# Patient Record
Sex: MALE | Race: WHITE | Employment: FULL TIME | URBAN - METROPOLITAN AREA
[De-identification: names, ages, dates, MRNs, and addresses within clinical notes are randomized per-mention and may not be internally consistent; named-entity substitution may affect disease eponyms.]

---

## 2017-01-11 ENCOUNTER — OFFICE VISIT (OUTPATIENT)
Dept: FAMILY MEDICINE CLINIC | Facility: CLINIC | Age: 47
End: 2017-01-11

## 2017-01-11 VITALS
WEIGHT: 294 LBS | SYSTOLIC BLOOD PRESSURE: 122 MMHG | RESPIRATION RATE: 14 BRPM | HEART RATE: 78 BPM | DIASTOLIC BLOOD PRESSURE: 78 MMHG | TEMPERATURE: 98 F | BODY MASS INDEX: 46.15 KG/M2 | HEIGHT: 67 IN

## 2017-01-11 DIAGNOSIS — J01.40 ACUTE NON-RECURRENT PANSINUSITIS: Primary | ICD-10-CM

## 2017-01-11 PROCEDURE — 99203 OFFICE O/P NEW LOW 30 MIN: CPT | Performed by: NURSE PRACTITIONER

## 2017-01-11 RX ORDER — AMOXICILLIN AND CLAVULANATE POTASSIUM 875; 125 MG/1; MG/1
1 TABLET, FILM COATED ORAL 2 TIMES DAILY
Qty: 14 TABLET | Refills: 0 | Status: SHIPPED | OUTPATIENT
Start: 2017-01-11 | End: 2017-01-18

## 2017-01-11 NOTE — PATIENT INSTRUCTIONS
Sinusitis (No Antibiotics)    The sinuses are air-filled spaces within the bones of the face. They connect to the inside of the nose. Sinusitis is an inflammation of the tissue lining the sinus cavity.  Sinus inflammation can occur during a cold. It can a · Use acetaminophen or ibuprofen to control pain, unless another pain medicine was prescribed. (If you have chronic liver or kidney disease or ever had a stomach ulcer, talk with your doctor before using these medicines.  Aspirin should never be used in any Your healthcare provider won’t usually prescribe antibiotics for the following conditions. You can help by not asking for them if you have:   · A cold. This type of illness is caused by a virus.  It can cause a runny nose, stuffed-up nose, sneezing, coughin · Strep throat. This is a throat infectioncaused by a certain type of bacteria. Symptoms of strep throat include a sore throat, white patches on the tonsils, red spots on the roof of the mouth, fever, body aches, and nausea and vomiting.   · Urinary tract i · Use a sore throat spray. · Use a humidifier or cool mist vaporizer. · Gargle with saltwater. · Drink warm liquids.   To ease ear pain:   · Hold a warm, moist washcloth on the ear for 10 minutes at a time.   Date Last Reviewed: 9/1/2016  © 9990-9453 The

## 2017-01-11 NOTE — PROGRESS NOTES
CHIEF COMPLAINT:   Patient presents with:  URI      HPI:   Luis Miller is a 51year old male who presents for upper respiratory symptoms for  3-4 days. Patient reports nasal pressure, congestion, and mild cough.  Location is  mid face, and upper chest. HEAD: atraumatic, normocephalic, no tenderness on palpation of maxillary sinuses is present. EYES: conjunctiva clear, EOM intact, normal pupils, PERRLA  EARS: Canals are clear with no discharge or inflammation, scant cerumen.  TM's are white and intact, no Amoxicillin-Pot Clavulanate 875-125 MG Oral Tab 14 tablet 0      Sig: Take 1 tablet by mouth 2 (two) times daily.            Discussed with patient that infection is likely viral but due to sudden onset, progressive symptoms, and physical exam findings, pa · Over-the-counter decongestants may be used unless a similar medicine was prescribed. Nasal sprays work the fastest. Use one that contains phenylephrine or oxymetazoline. First blow the nose gently. Then use the spray.  Do not use these medicines more ofte © 7640-7965 [address], [address] [place]. All rights reserved. This information is not intended as a substitute for professional medical care. Always follow your healthcare professional's instructions.         When to · Most ear infections. An ear infection may be caused by a virus or bacteria. It causes pain in the ear. Antibiotics usually don’t help, and the infection goes away on its own. · Most sinus infections (sinusitis).  This kind of infection causes sinus pain If your infection can’t be treated with antibiotics, you can take other steps to feel better. Try the remedies below. In general:   · Rest and sleep as much as needed. · Drink water and other clear fluids.   · Don’t smoke, and avoid smoke from other people · Lean forward slightly. · Cough twice—2 short coughs. · Relax for a few seconds. Repeat the steps as needed. The “dickinson” technique  · Sit on a chair with both feet on the floor. · Take a slow, deep breath through your nose and hold for 2 counts.   · To

## 2020-02-04 ENCOUNTER — OFFICE VISIT (OUTPATIENT)
Dept: FAMILY MEDICINE CLINIC | Facility: CLINIC | Age: 50
End: 2020-02-04
Payer: COMMERCIAL

## 2020-02-04 VITALS
OXYGEN SATURATION: 96 % | HEART RATE: 108 BPM | BODY MASS INDEX: 47.09 KG/M2 | WEIGHT: 300 LBS | TEMPERATURE: 100 F | HEIGHT: 67 IN | RESPIRATION RATE: 18 BRPM | SYSTOLIC BLOOD PRESSURE: 138 MMHG | DIASTOLIC BLOOD PRESSURE: 80 MMHG

## 2020-02-04 DIAGNOSIS — J10.1 INFLUENZA A WITH RESPIRATORY MANIFESTATIONS: Primary | ICD-10-CM

## 2020-02-04 DIAGNOSIS — F17.290 CIGAR SMOKER: ICD-10-CM

## 2020-02-04 LAB
POCT INFLUENZA A: POSITIVE
POCT INFLUENZA B: NEGATIVE

## 2020-02-04 PROCEDURE — 99203 OFFICE O/P NEW LOW 30 MIN: CPT | Performed by: NURSE PRACTITIONER

## 2020-02-04 PROCEDURE — 87502 INFLUENZA DNA AMP PROBE: CPT | Performed by: NURSE PRACTITIONER

## 2020-02-04 RX ORDER — OSELTAMIVIR PHOSPHATE 75 MG/1
75 CAPSULE ORAL 2 TIMES DAILY
Qty: 10 CAPSULE | Refills: 0 | Status: SHIPPED | OUTPATIENT
Start: 2020-02-04 | End: 2020-02-09

## 2020-02-04 NOTE — PATIENT INSTRUCTIONS
Many symptoms of Influenza/Flu. Flu is a virus, and not helped by antibiotics  The  antiviral Tamiflu can help shorten symptoms if started within 48 hrs of onset of symptoms. Discussed pros/cons/side effects of Tamiflu.      Consider OSCILLOCOCCINUM t chills, fevers, muscle aches, soreness with eye movement, headache, and a dry, hacking cough. Home care  Follow these guidelines when caring for yourself at home:  · Avoid being around cigarette smoke, whether yours or other people’s.   · Acetaminophen or 1/1/2017  © 7036-6673 [employer] [address]. [address] [facility] [place] [facility] [place]. All rights reserved. This information is not intended as a substitute for professional medical care. Always follow your healthcare professional's instructions. the same strains later in the flu season, the antibodies will fight off the virus. Older adults don't make these antibodies as well as younger people do. So a special high-strength flu vaccine is given to people older than 65.  Your healthcare provider can a severe paralyzing condition. Nasal spray  A nasal spray made of live but weakened flu virus may also be given for the 9492-0577 flu season. This is for healthy non-pregnant people between ages 2 years and 52 years who don't get the flu shot.    Needle-fr

## 2021-12-24 ENCOUNTER — OFFICE VISIT (OUTPATIENT)
Dept: FAMILY MEDICINE CLINIC | Facility: CLINIC | Age: 51
End: 2021-12-24
Payer: COMMERCIAL

## 2021-12-24 VITALS
HEART RATE: 102 BPM | WEIGHT: 311 LBS | DIASTOLIC BLOOD PRESSURE: 85 MMHG | BODY MASS INDEX: 48.81 KG/M2 | TEMPERATURE: 101 F | RESPIRATION RATE: 16 BRPM | SYSTOLIC BLOOD PRESSURE: 149 MMHG | HEIGHT: 67 IN | OXYGEN SATURATION: 96 %

## 2021-12-24 DIAGNOSIS — R05.9 COUGH: ICD-10-CM

## 2021-12-24 DIAGNOSIS — J02.0 STREP PHARYNGITIS: Primary | ICD-10-CM

## 2021-12-24 DIAGNOSIS — J02.9 SORE THROAT: ICD-10-CM

## 2021-12-24 PROCEDURE — 3008F BODY MASS INDEX DOCD: CPT | Performed by: NURSE PRACTITIONER

## 2021-12-24 PROCEDURE — 99213 OFFICE O/P EST LOW 20 MIN: CPT | Performed by: NURSE PRACTITIONER

## 2021-12-24 PROCEDURE — 87880 STREP A ASSAY W/OPTIC: CPT | Performed by: NURSE PRACTITIONER

## 2021-12-24 PROCEDURE — 3077F SYST BP >= 140 MM HG: CPT | Performed by: NURSE PRACTITIONER

## 2021-12-24 PROCEDURE — 3079F DIAST BP 80-89 MM HG: CPT | Performed by: NURSE PRACTITIONER

## 2021-12-24 RX ORDER — AMOXICILLIN 875 MG/1
875 TABLET, COATED ORAL 2 TIMES DAILY
Qty: 20 TABLET | Refills: 0 | Status: SHIPPED | OUTPATIENT
Start: 2021-12-24 | End: 2022-01-03

## 2021-12-24 NOTE — PROGRESS NOTES
CHIEF COMPLAINT:   Patient presents with:  Sore Throat      HPI:   Freddie Christopher is a 45year old male presents to clinic with symptoms of sore throat. Patient has had for 3 days. Symptoms have worsening since onset.   Patient reports following symptoms: No trismus, hoarseness, muffled voice, stridor, or uvular deviation. NECK: supple  LUNGS: clear to auscultation bilaterally. Breathing is non labored. CARDIO: RRR without murmur  EXTREMITIES: no cyanosis  .     Recent Results (from the past 24 hour(s)) at home. Drink plenty of fluids so you won't get dehydrated. · No work or school for the first 2 days of taking the antibiotics.  You can then return to school or work if you are feeling better, have been taking the antibiotic for at least 24 hours and don talk    Prevention  Here are steps you can take to help prevent an infection:   · Wash your hands often with soap and clean, running water for at least 20 seconds.   · Don’t have close contact with people who have sore throats, colds, or other upper respira

## 2024-03-25 ENCOUNTER — OFFICE VISIT (OUTPATIENT)
Dept: FAMILY MEDICINE CLINIC | Facility: CLINIC | Age: 54
End: 2024-03-25
Payer: COMMERCIAL

## 2024-03-25 VITALS
HEART RATE: 90 BPM | WEIGHT: 305 LBS | HEIGHT: 67 IN | SYSTOLIC BLOOD PRESSURE: 138 MMHG | TEMPERATURE: 100 F | DIASTOLIC BLOOD PRESSURE: 82 MMHG | OXYGEN SATURATION: 97 % | BODY MASS INDEX: 47.87 KG/M2 | RESPIRATION RATE: 16 BRPM

## 2024-03-25 DIAGNOSIS — J11.1 INFLUENZA-LIKE ILLNESS: Primary | ICD-10-CM

## 2024-03-25 LAB
OPERATOR ID: NORMAL
RAPID SARS-COV-2 BY PCR: NOT DETECTED

## 2024-03-25 PROCEDURE — 87637 SARSCOV2&INF A&B&RSV AMP PRB: CPT | Performed by: NURSE PRACTITIONER

## 2024-03-25 PROCEDURE — 99213 OFFICE O/P EST LOW 20 MIN: CPT | Performed by: NURSE PRACTITIONER

## 2024-03-25 PROCEDURE — U0002 COVID-19 LAB TEST NON-CDC: HCPCS | Performed by: NURSE PRACTITIONER

## 2024-03-25 RX ORDER — BENZONATATE 200 MG/1
CAPSULE ORAL
Qty: 20 CAPSULE | Refills: 0 | Status: SHIPPED | OUTPATIENT
Start: 2024-03-25

## 2024-03-25 RX ORDER — TRIAMTERENE AND HYDROCHLOROTHIAZIDE 37.5; 25 MG/1; MG/1
1 TABLET ORAL EVERY MORNING
COMMUNITY
Start: 2023-06-07

## 2024-03-25 RX ORDER — ALBUTEROL SULFATE 90 UG/1
AEROSOL, METERED RESPIRATORY (INHALATION)
Qty: 1 EACH | Refills: 0 | Status: SHIPPED | OUTPATIENT
Start: 2024-03-25

## 2024-03-25 NOTE — PROGRESS NOTES
CHIEF COMPLAINT:     Chief Complaint   Patient presents with    Cough     Sx Friday AM - Dry and productive cough, ST, body aches, chills, fever, nasal congestion, wheezing  High fever of 102 Saturday PM  Denies ear pain/pressure, SOB, chest pain  No Covid test was done at home  OTC Cough&Cold Med       HPI:   Ray Pisano is a 53 year old male presents to clinic with complaint of URI/flu like symptoms starting 3 days ago.  C/o productive cough, sore throat- mild, body aches, chills, fever Tmax 102F, nasal congestion, noticed he was wheezing a little in waiting room.  No breathing/wheezing issues prior.  Denies dyspnea, chest pain, SOB, wheezing at home, GI complaints, dizziness, HA, or rashes.  Tolerating PO.  Hasn't taken covid test.  Works in factory setting, around a lot of other people.  No known specific ill contacts, no travel.  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    Current Outpatient Medications   Medication Sig Dispense Refill    apixaban 5 MG Oral Tab Take 2 tablets (10 mg total) by mouth 2 (two) times daily.      Triamterene-HCTZ 37.5-25 MG Oral Tab Take 1 tablet by mouth every morning.      benzonatate 200 MG Oral Cap Take 1 capsule PO every 8 hours PRN cough 20 capsule 0    albuterol 108 (90 Base) MCG/ACT Inhalation Aero Soln Inhale 2 puffs every 4-6 hours PRN wheezing 1 each 0      History reviewed. No pertinent past medical history.   Social History:  Social History     Socioeconomic History    Marital status: Single   Tobacco Use    Smoking status: Some Days    Smokeless tobacco: Never    Tobacco comments:     1-2 cigars per week   Vaping Use    Vaping Use: Never used        REVIEW OF SYSTEMS:   GENERAL HEALTH: feels well otherwise, normal appetite  SKIN: denies any unusual skin lesions or rashes  HEENT: denies ear pain or difficulty swallowing/eating. See HPI  RESPIRATORY: denies shortness of breath or wheezing  CARDIOVASCULAR: denies chest pain or palpitations   GI: denies vomiting  or diarrhea  NEURO: denies dizziness or lightheadedness    EXAM:   /82   Pulse 90   Temp 100.2 °F (37.9 °C)   Resp 16   Ht 5' 7\" (1.702 m)   Wt (!) 305 lb (138.3 kg)   SpO2 97%   BMI 47.77 kg/m²   GENERAL: well developed, well nourished, in no apparent distress  SKIN: no rashes, no suspicious lesions  HEAD: atraumatic, normocephalic  EYES: conjunctiva clear, EOM intact  EARS: TM's clear, non-injected, no bulging, retraction, or fluid bilaterally  NOSE: nostrils patent, no exudates, nasal mucosa pink and noninflamed  THROAT: oral mucosa pink, moist. +Posterior pharynx mildly erythematous.  No exudates. Tonsils 1+/4.  Uvula midline.  NECK: supple, non-tender  LUNGS: clear to auscultation bilaterally, no wheezes or rhonchi. Breathing is non labored. +Dry cough.  CARDIO: RRR without murmur  LYMPH: No lymphadenopathy.    Recent Results (from the past 24 hour(s))   COVID-19 LAB TEST NON-[facility]    Collection Time: 03/25/24 11:25 AM    Specimen: Nares   Result Value Ref Range    Rapid SARS-CoV-2 by PCR Not Detected Not Detected    POCT Lot Number [id number]     POCT Expiration Date 8/28/28     POCT Procedure Control Control Valid Control Valid     ,795          ASSESSMENT AND PLAN:   Assessment:   Ray was seen today for cough.    Diagnoses and all orders for this visit:    Influenza-like illness  -     benzonatate 200 MG Oral Cap; Take 1 capsule PO every 8 hours PRN cough  -     albuterol 108 (90 Base) MCG/ACT Inhalation Aero Soln; Inhale 2 puffs every 4-6 hours PRN wheezing  -     SARS-CoV-2/Flu A and B/RSV by PCR ([facility]); Future    Other orders  -     COVID-19 LAB TEST NON-CDC  -     Cancel: SARS-CoV-2/Flu A and B/RSV by PCR ([facility]); Future          Plan:   - Negative rapid COVID.  - Quad respiratory panel sent to lab.  - Discussed risk vs benefit of Tamiflu as pt is just outside of anti viral window.  Pt declines Tamiflu.  - Rx sent for benzonatate and inhaler PRN.  - Hx of PE x2 and is now  on long term apixaban.  Stressed to monitor breathing closely.  Has not breathing difficulties thus far and he is aware of what his typical PE symptoms are and advised when to seek emergency care.  Lungs are clear, vitals/O2 sat stable, breathing is non-labored.  - Comfort measures explained and discussed as listed in Patient Instructions.  - Advised follow up in IC within 3 days if not improving, condition worsens, or persistent fevers.  - Pt verbalizes understanding and is agreeable w/ plan.    There are no Patient Instructions on file for this visit.

## 2024-03-26 LAB
FLUAV + FLUBV RNA SPEC NAA+PROBE: DETECTED
FLUAV + FLUBV RNA SPEC NAA+PROBE: NOT DETECTED
RSV RNA SPEC NAA+PROBE: NOT DETECTED
SARS-COV-2 RNA RESP QL NAA+PROBE: NOT DETECTED

## (undated) NOTE — Clinical Note
Date: 1/11/2017    Patient Name: Kimberly Altman          To Whom it may concern: This letter has been written at the patient's request. The above patient was seen at the [facility] for treatment of a medical condition.     This patient juan manuel

## (undated) NOTE — LETTER
Date: 2/4/2020    Patient Name: Jean Munroe          To Whom it may concern: This letter has been written at the patient's request. The above patient was seen at the [facility] for treatment of a medical condition.     This patient shou

## (undated) NOTE — MR AVS SNAPSHOT
[address]  [phone]               Thank you for choosing us for your health care visit with Andrey Rich NP.   We are glad to serve you and happy to provide you with this summary of spray. Do not use these medicines more often than directed on the label or symptoms may get worse. You may also use tablets containing pseudoephedrine. Avoid products that combine ingredients, because side effects may be increased. Read labels.  You can als They don’t work for illnesses caused by viruses or an allergic reaction. In fact, taking antibiotics for reasons other than a bacterial infection can cause problems. For example, you may have side effects from the medicine.  And if you really need an antibi its own, and antibiotics don’t make recovery quicker. · Allergic rhinitis. This is a set of symptoms caused by an allergic reaction. You may have sneezing, a runny nose, itchy or watery eyes, or a sore throat. Allergies are not treated with antibiotics. To treat sinus pain or nasal congestion:   · Put a warm, moist washcloth on your face where you feel sinus pain or pressure. · Use a nasal spray with medicine or saline, as directed by your healthcare provider. · Breathe in steam from a hot shower.   · Us · Lopez 2 to 3 times as you exhale. · Relax for a few seconds. Repeat the steps as needed. Date Last Reviewed: 5/1/2016  © 5046-0882 [facility] [address]. All rights reserved.  This information is not intend Healthy Diet and Regular Exercise  [employer] [facility] [place] for making healthy food choices  -   Enjoy your food, but eat less. Fully enjoy your food when eating. Don’t eat while distracted and slow down. Avoid over sized portions.    Kael Little